# Patient Record
Sex: MALE | Race: WHITE | NOT HISPANIC OR LATINO | URBAN - METROPOLITAN AREA
[De-identification: names, ages, dates, MRNs, and addresses within clinical notes are randomized per-mention and may not be internally consistent; named-entity substitution may affect disease eponyms.]

---

## 2020-11-16 ENCOUNTER — OFFICE VISIT (OUTPATIENT)
Dept: URBAN - METROPOLITAN AREA CLINIC 52 | Facility: CLINIC | Age: 70
End: 2020-11-16
Payer: MEDICARE

## 2020-11-16 DIAGNOSIS — H43.813 VITREOUS DEGENERATION, BILATERAL: ICD-10-CM

## 2020-11-16 DIAGNOSIS — H25.13 AGE-RELATED NUCLEAR CATARACT, BILATERAL: Primary | ICD-10-CM

## 2020-11-16 DIAGNOSIS — H52.03 HYPERMETROPIA, BILATERAL: ICD-10-CM

## 2020-11-16 PROCEDURE — 92014 COMPRE OPH EXAM EST PT 1/>: CPT | Performed by: OPHTHALMOLOGY

## 2022-01-04 ENCOUNTER — OFFICE VISIT (OUTPATIENT)
Dept: URBAN - METROPOLITAN AREA CLINIC 52 | Facility: CLINIC | Age: 72
End: 2022-01-04
Payer: MEDICARE

## 2022-01-04 PROCEDURE — 99214 OFFICE O/P EST MOD 30 MIN: CPT | Performed by: OPHTHALMOLOGY

## 2022-01-04 ASSESSMENT — INTRAOCULAR PRESSURE
OD: 12
OD: 16
OS: 13
OS: 17

## 2022-03-29 NOTE — IMPRESSION/PLAN
Impression: Age-related nuclear cataract, bilateral: H25.13. Plan: Cataracts account for the patient's complaints. No treatment currently recommended. The patient will monitor vision changes and contact us with any decrease in vision. 
S/P LASIK OU
Impression: Hypermetropia, bilateral: H52.03. Plan: New glasses Rx was given today.
Impression: Vitreous degeneration, bilateral: H43.813. Plan: Discussed, in detail, and Reassured pt about diagnosis. Discussed treatment options with patient, though no treatment is required/recommend at this time. Discussed signs and symptoms of PVD/floaters. Discussed signs and symptoms of retinal detachment. Will continue to observe for any changes.
Detail Level: Simple
Price (Do Not Change): 0.00
Instructions: This plan will send the code [id number] to the PM system.  DO NOT or CHANGE the price.

## 2023-01-19 ENCOUNTER — OFFICE VISIT (OUTPATIENT)
Dept: URBAN - METROPOLITAN AREA CLINIC 52 | Facility: CLINIC | Age: 73
End: 2023-01-19
Payer: MEDICARE

## 2023-01-19 DIAGNOSIS — H25.13 AGE-RELATED NUCLEAR CATARACT, BILATERAL: Primary | ICD-10-CM

## 2023-01-19 DIAGNOSIS — H43.813 VITREOUS DEGENERATION, BILATERAL: ICD-10-CM

## 2023-01-19 PROCEDURE — 92134 CPTRZ OPH DX IMG PST SGM RTA: CPT | Performed by: OPHTHALMOLOGY

## 2023-01-19 PROCEDURE — 99214 OFFICE O/P EST MOD 30 MIN: CPT | Performed by: OPHTHALMOLOGY

## 2023-01-19 ASSESSMENT — INTRAOCULAR PRESSURE
OS: 19
OS: 16
OD: 16
OD: 15

## 2024-01-22 ENCOUNTER — OFFICE VISIT (OUTPATIENT)
Dept: URBAN - METROPOLITAN AREA CLINIC 52 | Facility: CLINIC | Age: 74
End: 2024-01-22
Payer: MEDICARE

## 2024-01-22 DIAGNOSIS — H43.813 VITREOUS DEGENERATION, BILATERAL: ICD-10-CM

## 2024-01-22 DIAGNOSIS — H25.13 AGE-RELATED NUCLEAR CATARACT, BILATERAL: Primary | ICD-10-CM

## 2024-01-22 PROCEDURE — 92134 CPTRZ OPH DX IMG PST SGM RTA: CPT | Performed by: OPHTHALMOLOGY

## 2024-01-22 PROCEDURE — 99214 OFFICE O/P EST MOD 30 MIN: CPT | Performed by: OPHTHALMOLOGY

## 2024-01-22 ASSESSMENT — INTRAOCULAR PRESSURE
OS: 18
OD: 19
OD: 17
OS: 19

## 2024-01-22 ASSESSMENT — KERATOMETRY
OS: 40.25
OD: 40.63

## 2024-01-22 ASSESSMENT — VISUAL ACUITY
OD: 20/20
OS: 20/20

## 2025-01-24 ENCOUNTER — OFFICE VISIT (OUTPATIENT)
Dept: URBAN - METROPOLITAN AREA CLINIC 52 | Facility: CLINIC | Age: 75
End: 2025-01-24
Payer: MEDICARE

## 2025-01-24 DIAGNOSIS — H02.834 DERMATOCHALASIS OF LEFT UPPER EYELID: ICD-10-CM

## 2025-01-24 DIAGNOSIS — H43.813 VITREOUS DEGENERATION, BILATERAL: ICD-10-CM

## 2025-01-24 DIAGNOSIS — H35.363 DRUSEN (DEGENERATIVE) OF MACULA, BILATERAL: ICD-10-CM

## 2025-01-24 DIAGNOSIS — H25.13 AGE-RELATED NUCLEAR CATARACT, BILATERAL: Primary | ICD-10-CM

## 2025-01-24 DIAGNOSIS — H02.88A MEIBOMIAN GLAND DYSFNCT RIGHT EYE, UPPER AND LOWER EYELIDS: ICD-10-CM

## 2025-01-24 DIAGNOSIS — H02.831 DERMATOCHALASIS OF RIGHT UPPER EYELID: ICD-10-CM

## 2025-01-24 DIAGNOSIS — H02.88B MEIBOMIAN GLAND DYSFNCT LEFT EYE, UPPER AND LOWER EYELIDS: ICD-10-CM

## 2025-01-24 DIAGNOSIS — H52.4 PRESBYOPIA: ICD-10-CM

## 2025-01-24 PROCEDURE — 92134 CPTRZ OPH DX IMG PST SGM RTA: CPT | Performed by: OPTOMETRIST

## 2025-01-24 PROCEDURE — 92004 COMPRE OPH EXAM NEW PT 1/>: CPT | Performed by: OPTOMETRIST

## 2025-01-24 ASSESSMENT — INTRAOCULAR PRESSURE
OD: 15
OS: 16

## 2025-01-24 ASSESSMENT — VISUAL ACUITY
OS: 20/20
OD: 20/20